# Patient Record
Sex: FEMALE | Race: WHITE | URBAN - METROPOLITAN AREA
[De-identification: names, ages, dates, MRNs, and addresses within clinical notes are randomized per-mention and may not be internally consistent; named-entity substitution may affect disease eponyms.]

---

## 2022-06-30 RX ORDER — DICLOFENAC SODIUM 75 MG/1
TABLET, DELAYED RELEASE ORAL
COMMUNITY
End: 2022-11-04

## 2022-09-16 PROBLEM — G60.9 IDIOPATHIC PERIPHERAL NEUROPATHY: Status: ACTIVE | Noted: 2022-09-16

## 2022-09-16 PROBLEM — R92.8 ABNORMALITY OF RIGHT BREAST ON SCREENING MAMMOGRAM: Status: ACTIVE | Noted: 2022-09-16

## 2022-09-16 PROBLEM — E78.00 ELEVATED LDL CHOLESTEROL LEVEL: Status: ACTIVE | Noted: 2022-09-16

## 2022-09-16 PROBLEM — M19.049 HAND ARTHRITIS: Status: ACTIVE | Noted: 2022-09-16

## 2022-09-16 PROBLEM — E78.5 HYPERLIPIDEMIA: Status: ACTIVE | Noted: 2022-09-16

## 2022-09-16 PROBLEM — D12.6 ADENOMATOUS POLYP OF COLON: Status: ACTIVE | Noted: 2022-09-16

## 2022-09-27 PROBLEM — M77.9 BONY SPUR: Status: ACTIVE | Noted: 2022-09-27

## 2022-09-27 PROBLEM — H01.02B SQUAMOUS BLEPHARITIS OF UPPER AND LOWER EYELIDS OF BOTH EYES: Status: ACTIVE | Noted: 2020-08-03

## 2022-09-27 PROBLEM — H01.02A SQUAMOUS BLEPHARITIS OF UPPER AND LOWER EYELIDS OF BOTH EYES: Status: ACTIVE | Noted: 2020-08-03

## 2023-12-20 PROBLEM — M85.89 OSTEOPENIA OF MULTIPLE SITES: Status: ACTIVE | Noted: 2023-12-20

## 2023-12-20 PROBLEM — Z12.31 SCREENING MAMMOGRAM FOR BREAST CANCER: Status: ACTIVE | Noted: 2023-12-20

## 2023-12-20 PROBLEM — M54.50 CHRONIC BILATERAL LOW BACK PAIN WITHOUT SCIATICA: Status: ACTIVE | Noted: 2023-12-20

## 2023-12-20 PROBLEM — G89.29 CHRONIC BILATERAL LOW BACK PAIN WITHOUT SCIATICA: Status: ACTIVE | Noted: 2023-12-20

## 2023-12-20 PROBLEM — E55.9 VITAMIN D DEFICIENCY: Status: ACTIVE | Noted: 2023-12-20

## 2024-09-19 PROBLEM — Z00.00 MEDICARE ANNUAL WELLNESS VISIT, SUBSEQUENT: Status: ACTIVE | Noted: 2024-09-19

## 2024-10-19 PROBLEM — Z00.00 MEDICARE ANNUAL WELLNESS VISIT, SUBSEQUENT: Status: RESOLVED | Noted: 2024-09-19 | Resolved: 2024-10-19

## 2025-05-15 ENCOUNTER — OFFICE VISIT (OUTPATIENT)
Age: 75
End: 2025-05-15

## 2025-05-15 VITALS
OXYGEN SATURATION: 96 % | WEIGHT: 150.2 LBS | RESPIRATION RATE: 18 BRPM | HEIGHT: 72 IN | BODY MASS INDEX: 20.34 KG/M2 | HEART RATE: 75 BPM | SYSTOLIC BLOOD PRESSURE: 140 MMHG | TEMPERATURE: 98.2 F | DIASTOLIC BLOOD PRESSURE: 83 MMHG

## 2025-05-15 DIAGNOSIS — B00.9 HERPES SIMPLEX: Primary | ICD-10-CM

## 2025-05-15 RX ORDER — ACYCLOVIR 50 MG/G
OINTMENT TOPICAL
Qty: 15 G | Refills: 1 | Status: SHIPPED | OUTPATIENT
Start: 2025-05-15 | End: 2025-05-22

## 2025-05-15 RX ORDER — VALACYCLOVIR HYDROCHLORIDE 1 G/1
2000 TABLET, FILM COATED ORAL 2 TIMES DAILY
Qty: 4 TABLET | Refills: 0 | Status: SHIPPED | OUTPATIENT
Start: 2025-05-15

## 2025-05-15 ASSESSMENT — ENCOUNTER SYMPTOMS
SORE THROAT: 0
NAUSEA: 0
DIARRHEA: 0
VOMITING: 0

## 2025-05-15 NOTE — PROGRESS NOTES
Subjective     Chief Complaint   Patient presents with    Herpes Zoster     Pt demonstrates possible shingles after having left sided facial pain and a red area on left side of lip. Pt states it is painful and feels like when she had it on the right side of face many years ago. Onset 6 days ago       HPI 74-year-old female presents for what she thinks is shingles started approximately 6 days ago.  She has a small lesion on the left lip region left bottom.  No fever chills nausea vomiting no treatment.    Past Medical History:   Diagnosis Date    Adenomatous polyp of colon     treated with colon resection in 2006 (mother had colon cancer)    Bony spur removal     back lower right jaw    GERD (gastroesophageal reflux disease)     colon polyps    Hearing loss 1/1/2023    Lactose intolerance     Osteopenia     intolerant of boniva    Shingles     R cheek in her 50's       Past Surgical History:   Procedure Laterality Date    COLECTOMY  2007    COLONOSCOPY      PALATOPLASTY W/ ILIAC CREST BONE GRAFT Right     SUBTOTAL COLECTOMY      TONGUE BIOPSY  08/10/2022    normal results per patient    TONSILLECTOMY  1959       Family History   Problem Relation Age of Onset    Cancer Mother         colon    Cancer Father         lung    Vision Loss Paternal Grandfather         Glaucoma    Hearing Loss Sister        Allergies   Allergen Reactions    Codeine Itching     Other reaction(s): vomiting  Other reaction(s): Vomiting  Event:        Fentanyl Itching     Other reaction(s): Itching  Event:        Sulfa Antibiotics Hives     Other reaction(s): hives  Other reaction(s): Hives  Event:           Social History     Tobacco Use    Smoking status: Never     Passive exposure: Never    Smokeless tobacco: Never   Substance Use Topics    Alcohol use: Yes     Alcohol/week: 10.0 standard drinks of alcohol     Types: 4 Glasses of wine, 6 Shots of liquor per week    Drug use: Never       Vitals:    05/15/25 1339   BP: (!) 140/83   Pulse: 75